# Patient Record
Sex: FEMALE | Race: OTHER
[De-identification: names, ages, dates, MRNs, and addresses within clinical notes are randomized per-mention and may not be internally consistent; named-entity substitution may affect disease eponyms.]

---

## 2018-02-23 ENCOUNTER — HOSPITAL ENCOUNTER (OUTPATIENT)
Dept: HOSPITAL 88 - OR | Age: 48
Discharge: HOME | End: 2018-02-23
Attending: INTERNAL MEDICINE
Payer: COMMERCIAL

## 2018-02-23 DIAGNOSIS — G47.33: ICD-10-CM

## 2018-02-23 DIAGNOSIS — K21.0: ICD-10-CM

## 2018-02-23 DIAGNOSIS — D64.9: ICD-10-CM

## 2018-02-23 DIAGNOSIS — R03.0: ICD-10-CM

## 2018-02-23 DIAGNOSIS — K29.70: Primary | ICD-10-CM

## 2018-02-23 DIAGNOSIS — R71.0: ICD-10-CM

## 2018-02-23 DIAGNOSIS — K64.8: ICD-10-CM

## 2018-02-23 DIAGNOSIS — K58.9: ICD-10-CM

## 2018-02-23 DIAGNOSIS — K31.7: ICD-10-CM

## 2018-02-23 DIAGNOSIS — K63.5: ICD-10-CM

## 2018-02-23 LAB
BASOPHILS # BLD AUTO: 0 10*3/UL (ref 0–0.1)
BASOPHILS NFR BLD AUTO: 0.5 % (ref 0–1)
DEPRECATED NEUTROPHILS # BLD AUTO: 3.9 10*3/UL (ref 2.1–6.9)
EOSINOPHIL # BLD AUTO: 0 10*3/UL (ref 0–0.4)
EOSINOPHIL NFR BLD AUTO: 0.5 % (ref 0–6)
ERYTHROCYTE [DISTWIDTH] IN CORD BLOOD: 13.8 % (ref 11.7–14.4)
HCT VFR BLD AUTO: 35.4 % (ref 34.2–44.1)
HGB BLD-MCNC: 11 G/DL (ref 12–16)
IRON SATN MFR SERPL: 9 % (ref 15–50)
IRON SERPL-MCNC: 42 UG/DL (ref 50–170)
LYMPHOCYTES # BLD: 2.1 10*3/UL (ref 1–3.2)
LYMPHOCYTES NFR BLD AUTO: 32.5 % (ref 18–39.1)
MCH RBC QN AUTO: 24.7 PG (ref 28–32)
MCHC RBC AUTO-ENTMCNC: 31.1 G/DL (ref 31–35)
MCV RBC AUTO: 79.4 FL (ref 81–99)
MONOCYTES # BLD AUTO: 0.4 10*3/UL (ref 0.2–0.8)
MONOCYTES NFR BLD AUTO: 6.7 % (ref 4.4–11.3)
NEUTS SEG NFR BLD AUTO: 59.6 % (ref 38.7–80)
PLATELET # BLD AUTO: 301 X10E3/UL (ref 140–360)
RBC # BLD AUTO: 4.46 X10E6/UL (ref 3.6–5.1)
RETICS/RBC NFR AUTO: 1.4 % (ref 0.8–2.2)
TIBC SERPL-MCNC: 458 UG/DL (ref 261–478)
TRANSFERRIN SERPL-MCNC: 327 MG/DL (ref 180–382)

## 2018-02-23 PROCEDURE — 83540 ASSAY OF IRON: CPT

## 2018-02-23 PROCEDURE — 45384 COLONOSCOPY W/LESION REMOVAL: CPT

## 2018-02-23 PROCEDURE — 81025 URINE PREGNANCY TEST: CPT

## 2018-02-23 PROCEDURE — 36415 COLL VENOUS BLD VENIPUNCTURE: CPT

## 2018-02-23 PROCEDURE — 84466 ASSAY OF TRANSFERRIN: CPT

## 2018-02-23 PROCEDURE — 85045 AUTOMATED RETICULOCYTE COUNT: CPT

## 2018-02-23 PROCEDURE — 85025 COMPLETE CBC W/AUTO DIFF WBC: CPT

## 2018-02-23 PROCEDURE — 43239 EGD BIOPSY SINGLE/MULTIPLE: CPT

## 2018-02-23 NOTE — OPERATIVE REPORT
DATE OF PROCEDURE:  February 23, 2018 



REFERRING PHYSICIAN:  Dr. Jodi Hinson. 



PROCEDURE PERFORMED:  EGD with biopsies and a colonoscopy with polypectomy.



INDICATIONS FOR EGD:  Is heartburn, indigestion,  dark stools.  



INDICATIONS FOR COLONOSCOPY:   Colorectal cancer screening.  Personal 

history of colon polyps and anemia.



MEDICATION:  Patient was done under MAC.  Please see anesthesiologist's 

note.



PROCEDURE:  With patient in the in the left lateral decubitus position, the 

flexible fiberoptic Olympus gastroscope was introduced into the esophagus 

under direct visualization without any difficulty.  There was some patchy 

erythema noted in the distal esophagus.  The scope was then advanced with 

ease into the stomach.  Mucosa overlying the antrum revealed some patchy 

erythema and mild to moderate edema and biopsies were obtained and sent to 

stain for H. pylori.  Several hyperplastic appearing polyps were noted in 

the body, some of which were partially excised with cold biopsy forceps.  

The pylorus appeared to be of normal contour and shape.  Was intubated with 

ease and the scope was advanced all the way to the 2nd portion of the 

duodenum.  The scope was then withdrawn slowly.  Mucosa overlying the 

proximal 2nd portion and the duodenal bulb appeared to be within normal 

limits.  The scope was then withdrawn back into the stomach and retroflexed 

and mucosa overlying the fundus and the cardia appeared to be within normal 

limits.  The scope was then straightened out and was subsequently 

withdrawn.  Patient tolerated the procedure well.



IMPRESSION:  

1. Distal esophagitis, mild.

2. Gastritis, biopsied.  Biopsy sent to stain for H. pylori.  

3. Gastric polyps, body, some partially excised with a cold biopsy 

forceps. 



PLAN:  Follow up pathology.  Increase Protonix to 40 mg 1 p.o. a.c. b.i.d. 



PROCEDURE:  Patient was then turned around and after adequate lubrication 

of the anal canal the flexible fiberoptic Olympus colonoscope was inserted 

into the rectum with ease and advanced all the way to the cecum.  The scope 

was then withdrawn slowly.  Mucosa overlying the cecum appeared to be 

within normal limits.  The mucosa overlying the ascending colon also 

appeared to be within normal limits.  One polyp was hot biopsied from the 

transverse colon.  The rest of the transverse, descending, sigmoid and 

rectum appeared to be within normal limits.  The scope was then retroflexed 

into the distal rectum.  Small internal hemorrhoids were noted, none of 

which was actively bleeding.  The scope was then straightened out.  The 

rectosigmoid area as well as the distal rectal area were decompressed.  

Scope was subsequently withdrawn.  Patient tolerated the procedure well.  



IMPRESSION 

1. Transverse colon polyp, hot biopsied.

2. Internal hemorrhoids, none actively bleeding.  



PLAN:  Follow up histology.  Initiate high-fiber, low-fat diet.  Initiate 

high-fiber supplement.  Patient will need a followup colonoscopy in 3 

years.  Findings on EGD and colon do not necessarily explain patient's 

blood loss.  The patient will need a small bowel series to complete workup. 

 









DD:  02/23/2018 16:30

DT:  02/23/2018 16:40

Job#:  [id number] 



cc:JODI HINSON MD

## 2018-03-09 ENCOUNTER — HOSPITAL ENCOUNTER (OUTPATIENT)
Dept: HOSPITAL 88 - DX | Age: 48
End: 2018-03-09
Attending: INTERNAL MEDICINE
Payer: COMMERCIAL

## 2018-03-09 DIAGNOSIS — D64.9: Primary | ICD-10-CM

## 2018-03-09 PROCEDURE — 74250 X-RAY XM SM INT 1CNTRST STD: CPT

## 2018-03-09 NOTE — DIAGNOSTIC IMAGING REPORT
PROCEDURE: SMALL BOWEL SERIES

 

COMPARISON: CT abdomen and pelvis 5/24/2017.

 

INDICATIONS: POLYPS, ANEMIA

 

TECHNIQUE: The patient was given oral contrast to drink and multiple 

sequential images of the abdomen were obtained through 40 minutes until 

contrast was noted to reach the ascending colon.

Multiple spot images of the small bowel and terminal ileum were 

obtained.

 

FINDINGS:

There is normal small bowel motility and mobility. Small bowel caliber 

and folds are within normal limits. There is no evidence of 

intraluminal mass or extrinsic compression. The terminal ileum is 

normal in appearance.  

 

 

CONCLUSION:

Normal fluoroscopic small bowel series.

 

 

Dictated by:  Manuel Rios M.D. on 3/09/2018 at 9:43     

Electronically approved by:  Manuel Rios M.D. on 3/09/2018 at 9:43

## 2020-07-10 ENCOUNTER — HOSPITAL ENCOUNTER (OUTPATIENT)
Dept: HOSPITAL 88 - RAD | Age: 50
End: 2020-07-10
Attending: INTERNAL MEDICINE
Payer: COMMERCIAL

## 2020-07-10 DIAGNOSIS — R50.9: Primary | ICD-10-CM

## 2020-07-10 DIAGNOSIS — R52: ICD-10-CM

## 2020-07-10 LAB
ANION GAP SERPL CALC-SCNC: 14.3 MMOL/L (ref 8–16)
BASOPHILS # BLD AUTO: 0.1 10*3/UL (ref 0–0.1)
BASOPHILS NFR BLD AUTO: 0.8 % (ref 0–1)
BUN SERPL-MCNC: 10 MG/DL (ref 7–26)
BUN/CREAT SERPL: 12 (ref 6–25)
CALCIUM SERPL-MCNC: 10 MG/DL (ref 8.4–10.2)
CHLORIDE SERPL-SCNC: 104 MMOL/L (ref 98–107)
CO2 SERPL-SCNC: 26 MMOL/L (ref 22–29)
DEPRECATED NEUTROPHILS # BLD AUTO: 4.1 10*3/UL (ref 2.1–6.9)
EGFRCR SERPLBLD CKD-EPI 2021: > 60 ML/MIN (ref 60–?)
EOSINOPHIL # BLD AUTO: 0.2 10*3/UL (ref 0–0.4)
EOSINOPHIL NFR BLD AUTO: 1.9 % (ref 0–6)
ERYTHROCYTE [DISTWIDTH] IN CORD BLOOD: 12.2 % (ref 11.7–14.4)
GLUCOSE SERPLBLD-MCNC: 94 MG/DL (ref 74–118)
HCT VFR BLD AUTO: 40.2 % (ref 34.2–44.1)
HGB BLD-MCNC: 13 G/DL (ref 12–16)
LYMPHOCYTES # BLD: 3 10*3/UL (ref 1–3.2)
LYMPHOCYTES NFR BLD AUTO: 38.2 % (ref 18–39.1)
MCH RBC QN AUTO: 29 PG (ref 28–32)
MCHC RBC AUTO-ENTMCNC: 32.3 G/DL (ref 31–35)
MCV RBC AUTO: 89.7 FL (ref 81–99)
MONOCYTES # BLD AUTO: 0.6 10*3/UL (ref 0.2–0.8)
MONOCYTES NFR BLD AUTO: 7 % (ref 4.4–11.3)
NEUTS SEG NFR BLD AUTO: 52 % (ref 38.7–80)
PLATELET # BLD AUTO: 284 X10E3/UL (ref 140–360)
POTASSIUM SERPL-SCNC: 4.3 MMOL/L (ref 3.5–5.1)
RBC # BLD AUTO: 4.48 X10E6/UL (ref 3.6–5.1)
SODIUM SERPL-SCNC: 140 MMOL/L (ref 136–145)

## 2020-07-10 PROCEDURE — 71046 X-RAY EXAM CHEST 2 VIEWS: CPT

## 2020-07-10 PROCEDURE — 80048 BASIC METABOLIC PNL TOTAL CA: CPT

## 2020-07-10 PROCEDURE — 36415 COLL VENOUS BLD VENIPUNCTURE: CPT

## 2020-07-10 PROCEDURE — 85025 COMPLETE CBC W/AUTO DIFF WBC: CPT

## 2020-07-10 NOTE — DIAGNOSTIC IMAGING REPORT
X-ray chest PA and lateral



History: Low-grade fever and pain



Comparison: None



Findings: Central airways unremarkable. Heart size normal. No pleural effusion

or pneumothorax. No focal lung disease. Degenerative changes of the thoracic

spine. Upper abdomen unremarkable.



Impression: No acute cardiopulmonary disease.



Signed by: Junior Russ MD on 7/10/2020 3:57 PM

## 2020-08-21 ENCOUNTER — HOSPITAL ENCOUNTER (OUTPATIENT)
Dept: HOSPITAL 88 - MAMMO | Age: 50
End: 2020-08-21
Attending: INTERNAL MEDICINE
Payer: COMMERCIAL

## 2020-08-21 DIAGNOSIS — Z78.0: ICD-10-CM

## 2020-08-21 DIAGNOSIS — Z12.31: Primary | ICD-10-CM

## 2020-08-21 PROCEDURE — 77067 SCR MAMMO BI INCL CAD: CPT

## 2020-08-21 PROCEDURE — 77080 DXA BONE DENSITY AXIAL: CPT

## 2020-08-24 NOTE — DIAGNOSTIC IMAGING REPORT
Exam: Bone mineral density study.



History:  Osteopenia.



Comparison:  None



Discussion: Evaluation of the left hip and lumbar spine was performed utilizing

DEXA Hologic bone densitometer. 



The study is technically adequate.



Left hip total bone mineral density: 0.932gm/cm2, T-score is -0.1, Z-score is

0.5. 

Left hip femoral neck bone mineral density: 0.857gm/cm2, T-score is 0.1,

Z-score is 1.0. 

Lumbar spine total bone mineral density:0.941gm/cm2, T-score is-1.0, Z-score is

-0.1.  





Impression:

1. Normal bone mineral density of the left hip, fracture risk is not increased.

2. Normal bone mineral density of the lumbar spine, fracture risk is not

increased.



Least significant change (LSC) for bone mineral density as provided by

 is 0.023 g/cm2 for lumbar spine and 0.027 g/cm2 for total hip.



10 -year fracture risk per WHO Fracture Risk Assessment Tool (FRAX) for:

Not reported because all T-scores at or above -1.0





The patient's fracture risk is compared to an age-matched control.



Medical evaluation for secondary causes of low bone bone mineral density may be

appropriate.



Correlate clinically for the necessity and timing of the next bone mineral

density study.



Signed by: Dr. Royce Johnston M.D. on 8/24/2020 9:06 AM

## 2021-06-29 ENCOUNTER — HOSPITAL ENCOUNTER (OUTPATIENT)
Dept: HOSPITAL 88 - MRI | Age: 51
End: 2021-06-29
Attending: INTERNAL MEDICINE
Payer: COMMERCIAL

## 2021-06-29 DIAGNOSIS — M54.12: Primary | ICD-10-CM

## 2021-06-29 PROCEDURE — 72141 MRI NECK SPINE W/O DYE: CPT

## 2021-07-14 ENCOUNTER — HOSPITAL ENCOUNTER (EMERGENCY)
Dept: HOSPITAL 88 - ER | Age: 51
Discharge: HOME | End: 2021-07-14
Payer: COMMERCIAL

## 2021-07-14 ENCOUNTER — HOSPITAL ENCOUNTER (EMERGENCY)
Dept: HOSPITAL 88 - FSED | Age: 51
Discharge: HOME | End: 2021-07-14
Payer: COMMERCIAL

## 2021-07-14 VITALS — SYSTOLIC BLOOD PRESSURE: 127 MMHG | DIASTOLIC BLOOD PRESSURE: 85 MMHG

## 2021-07-14 VITALS — WEIGHT: 175 LBS | HEIGHT: 63 IN | BODY MASS INDEX: 31.01 KG/M2

## 2021-07-14 VITALS — BODY MASS INDEX: 31.01 KG/M2 | HEIGHT: 63 IN | WEIGHT: 175 LBS

## 2021-07-14 DIAGNOSIS — E78.5: ICD-10-CM

## 2021-07-14 DIAGNOSIS — Z20.822: ICD-10-CM

## 2021-07-14 DIAGNOSIS — R00.2: Primary | ICD-10-CM

## 2021-07-14 DIAGNOSIS — R50.9: Primary | ICD-10-CM

## 2021-07-14 DIAGNOSIS — E11.9: ICD-10-CM

## 2021-07-14 DIAGNOSIS — N39.0: ICD-10-CM

## 2021-07-14 DIAGNOSIS — E11.65: ICD-10-CM

## 2021-07-14 DIAGNOSIS — R94.31: ICD-10-CM

## 2021-07-14 DIAGNOSIS — M54.5: ICD-10-CM

## 2021-07-14 LAB
ALBUMIN SERPL-MCNC: 3.7 G/DL (ref 3.5–5)
ALBUMIN/GLOB SERPL: 0.9 {RATIO} (ref 0.8–2)
ALP SERPL-CCNC: 62 IU/L (ref 40–150)
ALT SERPL-CCNC: 28 IU/L (ref 0–55)
ANION GAP SERPL CALC-SCNC: 14.5 MMOL/L (ref 8–16)
BACTERIA URNS QL MICRO: (no result) /HPF
BASOPHILS # BLD AUTO: 0 10*3/UL (ref 0–0.1)
BASOPHILS NFR BLD AUTO: 0.3 % (ref 0–1)
BUN SERPL-MCNC: 8 MG/DL (ref 7–26)
BUN/CREAT SERPL: 10 (ref 6–25)
CALCIUM SERPL-MCNC: 8.9 MG/DL (ref 8.4–10.2)
CHLORIDE SERPL-SCNC: 104 MMOL/L (ref 98–107)
CK MB SERPL-MCNC: 1 NG/ML (ref 0–5)
CK SERPL-CCNC: 274 IU/L (ref 29–168)
CLARITY UR: CLEAR
CO2 SERPL-SCNC: 25 MMOL/L (ref 22–29)
COLOR UR: YELLOW
DEPRECATED NEUTROPHILS # BLD AUTO: 10.1 10*3/UL (ref 2.1–6.9)
DEPRECATED RBC URNS MANUAL-ACNC: (no result) /HPF (ref 0–5)
EGFRCR SERPLBLD CKD-EPI 2021: 75 ML/MIN (ref 60–?)
EOSINOPHIL # BLD AUTO: 0 10*3/UL (ref 0–0.4)
EOSINOPHIL NFR BLD AUTO: 0.2 % (ref 0–6)
EPI CELLS URNS QL MICRO: (no result) /LPF
ERYTHROCYTE [DISTWIDTH] IN CORD BLOOD: 12.2 % (ref 11.7–14.4)
GLOBULIN PLAS-MCNC: 4.1 G/DL (ref 2.3–3.5)
GLUCOSE SERPLBLD-MCNC: 143 MG/DL (ref 74–118)
HCT VFR BLD AUTO: 38.6 % (ref 34.2–44.1)
HGB BLD-MCNC: 13.2 G/DL (ref 12–16)
KETONES UR QL STRIP.AUTO: NEGATIVE
LEUKOCYTE ESTERASE UR QL STRIP.AUTO: NEGATIVE
LIPASE SERPL-CCNC: 42 U/L (ref 8–78)
LYMPHOCYTES # BLD: 1.1 10*3/UL (ref 1–3.2)
LYMPHOCYTES NFR BLD AUTO: 9.2 % (ref 18–39.1)
MCH RBC QN AUTO: 29.5 PG (ref 28–32)
MCHC RBC AUTO-ENTMCNC: 34.2 G/DL (ref 31–35)
MCV RBC AUTO: 86.4 FL (ref 81–99)
MONOCYTES # BLD AUTO: 0.6 10*3/UL (ref 0.2–0.8)
MONOCYTES NFR BLD AUTO: 4.9 % (ref 4.4–11.3)
NEUTS SEG NFR BLD AUTO: 85.1 % (ref 38.7–80)
NITRITE UR QL STRIP.AUTO: NEGATIVE
PLATELET # BLD AUTO: 304 X10E3/UL (ref 140–360)
POTASSIUM SERPL-SCNC: 4.5 MMOL/L (ref 3.5–5.1)
PROT UR QL STRIP.AUTO: NEGATIVE
RBC # BLD AUTO: 4.47 X10E6/UL (ref 3.6–5.1)
RENAL EPI CELLS URNS QL MICRO: (no result)
SODIUM SERPL-SCNC: 139 MMOL/L (ref 136–145)
SP GR UR STRIP: 1.01 (ref 1.01–1.02)
UROBILINOGEN UR STRIP-MCNC: 0.2 MG/DL (ref 0.2–1)
WBC #/AREA URNS HPF: (no result) /HPF (ref 0–5)

## 2021-07-14 PROCEDURE — 36415 COLL VENOUS BLD VENIPUNCTURE: CPT

## 2021-07-14 PROCEDURE — 93005 ELECTROCARDIOGRAM TRACING: CPT

## 2021-07-14 PROCEDURE — 87086 URINE CULTURE/COLONY COUNT: CPT

## 2021-07-14 PROCEDURE — 99283 EMERGENCY DEPT VISIT LOW MDM: CPT

## 2021-07-14 PROCEDURE — 71045 X-RAY EXAM CHEST 1 VIEW: CPT

## 2021-07-14 PROCEDURE — 80053 COMPREHEN METABOLIC PANEL: CPT

## 2021-07-14 PROCEDURE — 87040 BLOOD CULTURE FOR BACTERIA: CPT

## 2021-07-14 PROCEDURE — 99284 EMERGENCY DEPT VISIT MOD MDM: CPT

## 2021-07-14 PROCEDURE — 81001 URINALYSIS AUTO W/SCOPE: CPT

## 2021-07-14 PROCEDURE — 85025 COMPLETE CBC W/AUTO DIFF WBC: CPT

## 2021-07-14 PROCEDURE — 82550 ASSAY OF CK (CPK): CPT

## 2021-07-14 PROCEDURE — 83605 ASSAY OF LACTIC ACID: CPT

## 2021-07-14 PROCEDURE — 74176 CT ABD & PELVIS W/O CONTRAST: CPT

## 2021-07-14 PROCEDURE — 84484 ASSAY OF TROPONIN QUANT: CPT

## 2021-07-14 PROCEDURE — 83690 ASSAY OF LIPASE: CPT

## 2021-07-14 PROCEDURE — 82553 CREATINE MB FRACTION: CPT

## 2021-08-16 ENCOUNTER — HOSPITAL ENCOUNTER (OUTPATIENT)
Dept: HOSPITAL 88 - MAMMO | Age: 51
End: 2021-08-16
Attending: INTERNAL MEDICINE
Payer: COMMERCIAL

## 2021-08-16 DIAGNOSIS — Z78.0: ICD-10-CM

## 2021-08-16 DIAGNOSIS — Z12.31: Primary | ICD-10-CM

## 2021-08-16 PROCEDURE — 77067 SCR MAMMO BI INCL CAD: CPT

## 2021-08-16 PROCEDURE — 77080 DXA BONE DENSITY AXIAL: CPT

## 2022-04-15 ENCOUNTER — HOSPITAL ENCOUNTER (OUTPATIENT)
Dept: HOSPITAL 88 - OR | Age: 52
Discharge: HOME | End: 2022-04-15
Attending: INTERNAL MEDICINE
Payer: COMMERCIAL

## 2022-04-15 VITALS — DIASTOLIC BLOOD PRESSURE: 90 MMHG | SYSTOLIC BLOOD PRESSURE: 120 MMHG

## 2022-04-15 DIAGNOSIS — Z20.822: ICD-10-CM

## 2022-04-15 DIAGNOSIS — Z79.899: ICD-10-CM

## 2022-04-15 DIAGNOSIS — E11.9: ICD-10-CM

## 2022-04-15 DIAGNOSIS — E78.6: ICD-10-CM

## 2022-04-15 DIAGNOSIS — K64.8: ICD-10-CM

## 2022-04-15 DIAGNOSIS — Z01.812: ICD-10-CM

## 2022-04-15 DIAGNOSIS — Z86.010: ICD-10-CM

## 2022-04-15 DIAGNOSIS — K20.90: ICD-10-CM

## 2022-04-15 DIAGNOSIS — K29.70: Primary | ICD-10-CM

## 2022-04-15 DIAGNOSIS — K31.7: ICD-10-CM

## 2022-04-15 DIAGNOSIS — Z01.810: ICD-10-CM

## 2022-04-15 DIAGNOSIS — K21.9: ICD-10-CM

## 2022-04-15 PROCEDURE — 43239 EGD BIOPSY SINGLE/MULTIPLE: CPT

## 2022-04-15 PROCEDURE — 45378 DIAGNOSTIC COLONOSCOPY: CPT

## 2022-04-15 PROCEDURE — 36415 COLL VENOUS BLD VENIPUNCTURE: CPT

## 2022-04-15 PROCEDURE — 93005 ELECTROCARDIOGRAM TRACING: CPT

## 2022-04-15 PROCEDURE — 81025 URINE PREGNANCY TEST: CPT

## 2022-04-15 PROCEDURE — 82948 REAGENT STRIP/BLOOD GLUCOSE: CPT

## 2022-09-07 ENCOUNTER — HOSPITAL ENCOUNTER (OUTPATIENT)
Dept: HOSPITAL 88 - MAMMO | Age: 52
End: 2022-09-07
Attending: INTERNAL MEDICINE
Payer: COMMERCIAL

## 2022-09-07 DIAGNOSIS — Z13.820: ICD-10-CM

## 2022-09-07 DIAGNOSIS — Z12.31: Primary | ICD-10-CM

## 2022-09-07 PROCEDURE — 77080 DXA BONE DENSITY AXIAL: CPT

## 2022-09-07 PROCEDURE — 77067 SCR MAMMO BI INCL CAD: CPT
